# Patient Record
Sex: MALE | Race: WHITE
[De-identification: names, ages, dates, MRNs, and addresses within clinical notes are randomized per-mention and may not be internally consistent; named-entity substitution may affect disease eponyms.]

---

## 2022-09-12 ENCOUNTER — HOSPITAL ENCOUNTER (EMERGENCY)
Dept: HOSPITAL 47 - EC | Age: 77
Discharge: HOME | End: 2022-09-12
Payer: MEDICARE

## 2022-09-12 VITALS — HEART RATE: 68 BPM | SYSTOLIC BLOOD PRESSURE: 132 MMHG | RESPIRATION RATE: 18 BRPM | DIASTOLIC BLOOD PRESSURE: 78 MMHG

## 2022-09-12 VITALS — TEMPERATURE: 98.6 F

## 2022-09-12 DIAGNOSIS — I10: ICD-10-CM

## 2022-09-12 DIAGNOSIS — Z88.6: ICD-10-CM

## 2022-09-12 DIAGNOSIS — M62.830: Primary | ICD-10-CM

## 2022-09-12 DIAGNOSIS — Z20.822: ICD-10-CM

## 2022-09-12 DIAGNOSIS — Z87.891: ICD-10-CM

## 2022-09-12 DIAGNOSIS — E78.5: ICD-10-CM

## 2022-09-12 DIAGNOSIS — E11.9: ICD-10-CM

## 2022-09-12 DIAGNOSIS — Z88.8: ICD-10-CM

## 2022-09-12 PROCEDURE — 87635 SARS-COV-2 COVID-19 AMP PRB: CPT

## 2022-09-12 PROCEDURE — 96372 THER/PROPH/DIAG INJ SC/IM: CPT

## 2022-09-12 PROCEDURE — 93005 ELECTROCARDIOGRAM TRACING: CPT

## 2022-09-12 PROCEDURE — 99283 EMERGENCY DEPT VISIT LOW MDM: CPT
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
## 2023-05-03 ENCOUNTER — HOSPITAL ENCOUNTER (EMERGENCY)
Dept: HOSPITAL 47 - EC | Age: 78
Discharge: HOME | End: 2023-05-03
Payer: OTHER GOVERNMENT

## 2023-05-03 VITALS — SYSTOLIC BLOOD PRESSURE: 124 MMHG | HEART RATE: 78 BPM | DIASTOLIC BLOOD PRESSURE: 86 MMHG

## 2023-05-03 VITALS — RESPIRATION RATE: 16 BRPM

## 2023-05-03 VITALS — TEMPERATURE: 97.7 F

## 2023-05-03 DIAGNOSIS — I10: ICD-10-CM

## 2023-05-03 DIAGNOSIS — R42: Primary | ICD-10-CM

## 2023-05-03 DIAGNOSIS — E11.9: ICD-10-CM

## 2023-05-03 DIAGNOSIS — Z87.891: ICD-10-CM

## 2023-05-03 DIAGNOSIS — Z88.8: ICD-10-CM

## 2023-05-03 PROCEDURE — 96374 THER/PROPH/DIAG INJ IV PUSH: CPT

## 2023-05-03 PROCEDURE — 93005 ELECTROCARDIOGRAM TRACING: CPT

## 2023-05-03 PROCEDURE — 99284 EMERGENCY DEPT VISIT MOD MDM: CPT
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
